# Patient Record
Sex: FEMALE | Race: WHITE | NOT HISPANIC OR LATINO | URBAN - METROPOLITAN AREA
[De-identification: names, ages, dates, MRNs, and addresses within clinical notes are randomized per-mention and may not be internally consistent; named-entity substitution may affect disease eponyms.]

---

## 2020-07-25 ENCOUNTER — TELEPHONE ENCOUNTER (OUTPATIENT)
Dept: URBAN - METROPOLITAN AREA CLINIC 13 | Facility: CLINIC | Age: 45
End: 2020-07-25

## 2020-07-26 ENCOUNTER — TELEPHONE ENCOUNTER (OUTPATIENT)
Dept: URBAN - METROPOLITAN AREA CLINIC 13 | Facility: CLINIC | Age: 45
End: 2020-07-26

## 2020-07-26 RX ORDER — METOPROLOL SUCCINATE 25 MG/1
TK 1 T PO  QD TABLET, EXTENDED RELEASE ORAL
Qty: 90 | Refills: 0 | Status: ACTIVE | COMMUNITY
Start: 2018-07-12

## 2020-07-26 RX ORDER — HYDROCODONE BITARTRATE AND ACETAMINOPHEN 10; 325 MG/1; MG/1
TABLET ORAL
Qty: 10 | Refills: 0 | Status: ACTIVE | COMMUNITY
Start: 2019-03-12

## 2020-07-26 RX ORDER — CLOBETASOL PROPIONATE 0.5 MG/G
APP TO RASH BID OINTMENT TOPICAL
Qty: 45 | Refills: 0 | Status: ACTIVE | COMMUNITY
Start: 2018-08-20

## 2020-07-26 RX ORDER — OMEPRAZOLE 40 MG/1
TK 1 C PO QD AC CAPSULE, DELAYED RELEASE ORAL
Qty: 30 | Refills: 0 | Status: ACTIVE | COMMUNITY
Start: 2019-03-08

## 2020-07-26 RX ORDER — CIPROFLOXACIN HYDROCHLORIDE 500 MG/1
TK 1 T PO Q 12 H FOR 10 DAYS TABLET, FILM COATED ORAL
Qty: 20 | Refills: 0 | Status: ACTIVE | COMMUNITY
Start: 2019-03-05

## 2020-07-26 RX ORDER — TIZANIDINE 4 MG/1
TABLET ORAL
Qty: 60 | Refills: 0 | Status: ACTIVE | COMMUNITY
Start: 2018-07-24

## 2020-07-26 RX ORDER — CLOBETASOL PROPIONATE 0.5 MG/G
OINTMENT TOPICAL
Qty: 45 | Refills: 0 | Status: ACTIVE | COMMUNITY
Start: 2018-08-31

## 2020-07-26 RX ORDER — HYDROCODONE BITARTRATE AND ACETAMINOPHEN 5; 325 MG/1; MG/1
TABLET ORAL
Qty: 25 | Refills: 0 | Status: ACTIVE | COMMUNITY
Start: 2018-06-21

## 2020-07-26 RX ORDER — CLOBETASOL PROPIONATE 0.5 MG/G
AEROSOL, FOAM TOPICAL
Qty: 100 | Refills: 0 | Status: ACTIVE | COMMUNITY
Start: 2018-01-29

## 2020-07-26 RX ORDER — ROSUVASTATIN CALCIUM 10 MG/1
TK 1 T PO  QD TABLET, FILM COATED ORAL
Qty: 90 | Refills: 0 | Status: ACTIVE | COMMUNITY
Start: 2018-08-08

## 2020-07-26 RX ORDER — LORAZEPAM 0.5 MG/1
TAKE 1 TABLET BY MOUTH EVERY 12 HOURS AS NEEDED TABLET ORAL
Qty: 30 | Refills: 0 | Status: ACTIVE | COMMUNITY
Start: 2019-01-24

## 2020-07-26 RX ORDER — SUCRALFATE 1 G/1
TABLET ORAL
Qty: 45 | Refills: 0 | Status: ACTIVE | COMMUNITY
Start: 2019-03-08

## 2020-07-26 RX ORDER — ONDANSETRON 4 MG/1
TABLET, ORALLY DISINTEGRATING ORAL
Qty: 9 | Refills: 0 | Status: ACTIVE | COMMUNITY
Start: 2019-03-12

## 2020-07-26 RX ORDER — TIZANIDINE 4 MG/1
TK 1 T PO  Q 8 H PRN TABLET ORAL
Qty: 60 | Refills: 0 | Status: ACTIVE | COMMUNITY
Start: 2018-05-31

## 2020-07-26 RX ORDER — NEBIVOLOL HYDROCHLORIDE 5 MG/1
TAKE 1 TABLET BY MOUTH EVERY DAY TABLET ORAL
Qty: 90 | Refills: 0 | Status: ACTIVE | COMMUNITY
Start: 2018-08-14

## 2020-07-26 RX ORDER — CLOBETASOL PROPIONATE 0.5 MG/G
APPLY AT BEDTIME TO SCALP AEROSOL, FOAM TOPICAL
Qty: 50 | Refills: 0 | Status: ACTIVE | COMMUNITY
Start: 2018-04-26

## 2020-07-26 RX ORDER — TRAMADOL HYDROCHLORIDE 50 MG/1
TAKE 1 TABLET EVERY 8 HOURS PRN TABLET ORAL
Refills: 0 | Status: ACTIVE | COMMUNITY
Start: 2018-05-31

## 2020-07-26 RX ORDER — CEPHALEXIN 500 MG/1
TK ONE C PO  TID CAPSULE ORAL
Qty: 21 | Refills: 0 | Status: ACTIVE | COMMUNITY
Start: 2018-06-15

## 2020-07-26 RX ORDER — CARISOPRODOL 350 MG/1
TK 1 T PO Q 12 H PRN TABLET ORAL
Qty: 45 | Refills: 0 | Status: ACTIVE | COMMUNITY
Start: 2018-05-16

## 2020-07-26 RX ORDER — AZITHROMYCIN DIHYDRATE 250 MG/1
TABLET, FILM COATED ORAL
Qty: 6 | Refills: 0 | Status: ACTIVE | COMMUNITY
Start: 2018-11-14

## 2020-07-26 RX ORDER — CLOBETASOL PROPIONATE 0.5 MG/ML
SOLUTION TOPICAL
Qty: 50 | Refills: 0 | Status: ACTIVE | COMMUNITY
Start: 2017-05-16

## 2020-07-26 RX ORDER — ESCITALOPRAM 10 MG/1
TAKE 1 TABLET BY MOUTH EVERY DAY TABLET, FILM COATED ORAL
Qty: 30 | Refills: 0 | Status: ACTIVE | COMMUNITY
Start: 2019-01-24

## 2020-07-26 RX ORDER — ONDANSETRON 8 MG/1
TABLET, ORALLY DISINTEGRATING ORAL
Qty: 9 | Refills: 0 | Status: ACTIVE | COMMUNITY
Start: 2019-03-05

## 2021-12-07 ENCOUNTER — OFFICE VISIT (OUTPATIENT)
Dept: URBAN - METROPOLITAN AREA CLINIC 72 | Facility: CLINIC | Age: 46
End: 2021-12-07
Payer: COMMERCIAL

## 2021-12-07 ENCOUNTER — WEB ENCOUNTER (OUTPATIENT)
Dept: URBAN - METROPOLITAN AREA CLINIC 72 | Facility: CLINIC | Age: 46
End: 2021-12-07

## 2021-12-07 ENCOUNTER — LAB OUTSIDE AN ENCOUNTER (OUTPATIENT)
Dept: URBAN - METROPOLITAN AREA CLINIC 72 | Facility: CLINIC | Age: 46
End: 2021-12-07

## 2021-12-07 ENCOUNTER — DASHBOARD ENCOUNTERS (OUTPATIENT)
Age: 46
End: 2021-12-07

## 2021-12-07 VITALS
BODY MASS INDEX: 23.88 KG/M2 | DIASTOLIC BLOOD PRESSURE: 79 MMHG | HEIGHT: 66 IN | TEMPERATURE: 97.3 F | SYSTOLIC BLOOD PRESSURE: 113 MMHG | WEIGHT: 148.6 LBS | HEART RATE: 75 BPM | RESPIRATION RATE: 18 BRPM

## 2021-12-07 DIAGNOSIS — R19.7 DIARRHEA, UNSPECIFIED TYPE: ICD-10-CM

## 2021-12-07 DIAGNOSIS — R19.4 CHANGE IN BOWEL HABITS: ICD-10-CM

## 2021-12-07 DIAGNOSIS — R10.11 RUQ PAIN: ICD-10-CM

## 2021-12-07 PROCEDURE — 99214 OFFICE O/P EST MOD 30 MIN: CPT | Performed by: INTERNAL MEDICINE

## 2021-12-07 RX ORDER — LORAZEPAM 0.5 MG/1
TAKE 1 TABLET BY MOUTH EVERY 12 HOURS AS NEEDED TABLET ORAL
Qty: 30 | Refills: 0 | Status: ON HOLD | COMMUNITY
Start: 2019-01-24

## 2021-12-07 RX ORDER — CIPROFLOXACIN HYDROCHLORIDE 500 MG/1
TK 1 T PO Q 12 H FOR 10 DAYS TABLET, FILM COATED ORAL
Qty: 20 | Refills: 0 | Status: ON HOLD | COMMUNITY
Start: 2019-03-05

## 2021-12-07 RX ORDER — ONDANSETRON 8 MG/1
TABLET, ORALLY DISINTEGRATING ORAL
Qty: 9 | Refills: 0 | Status: ON HOLD | COMMUNITY
Start: 2019-03-05

## 2021-12-07 RX ORDER — CLOBETASOL PROPIONATE 0.5 MG/ML
SOLUTION TOPICAL
Qty: 50 | Refills: 0 | Status: ON HOLD | COMMUNITY
Start: 2017-05-16

## 2021-12-07 RX ORDER — HYDROCODONE BITARTRATE AND ACETAMINOPHEN 10; 325 MG/1; MG/1
TABLET ORAL
Qty: 10 | Refills: 0 | Status: ON HOLD | COMMUNITY
Start: 2019-03-12

## 2021-12-07 RX ORDER — CEPHALEXIN 500 MG/1
TK ONE C PO  TID CAPSULE ORAL
Qty: 21 | Refills: 0 | Status: ON HOLD | COMMUNITY
Start: 2018-06-15

## 2021-12-07 RX ORDER — HYDROCODONE BITARTRATE AND ACETAMINOPHEN 5; 325 MG/1; MG/1
TABLET ORAL
Qty: 25 | Refills: 0 | Status: ON HOLD | COMMUNITY
Start: 2018-06-21

## 2021-12-07 RX ORDER — CLOBETASOL PROPIONATE 0.5 MG/G
AEROSOL, FOAM TOPICAL
Qty: 100 | Refills: 0 | Status: ON HOLD | COMMUNITY
Start: 2018-01-29

## 2021-12-07 RX ORDER — ESCITALOPRAM 10 MG/1
TAKE 1 TABLET BY MOUTH EVERY DAY TABLET, FILM COATED ORAL
Qty: 30 | Refills: 0 | Status: ON HOLD | COMMUNITY
Start: 2019-01-24

## 2021-12-07 RX ORDER — ROSUVASTATIN CALCIUM 10 MG/1
TK 1 T PO  QD TABLET, FILM COATED ORAL
Qty: 90 | Refills: 0 | Status: ACTIVE | COMMUNITY
Start: 2018-08-08

## 2021-12-07 RX ORDER — NEBIVOLOL HYDROCHLORIDE 5 MG/1
TAKE 1 TABLET BY MOUTH EVERY DAY TABLET ORAL
Qty: 90 | Refills: 0 | Status: ACTIVE | COMMUNITY
Start: 2018-08-14

## 2021-12-07 RX ORDER — TIZANIDINE 4 MG/1
TK 1 T PO  Q 8 H PRN TABLET ORAL
Qty: 60 | Refills: 0 | Status: ACTIVE | COMMUNITY
Start: 2018-05-31

## 2021-12-07 RX ORDER — AZITHROMYCIN DIHYDRATE 250 MG/1
TABLET, FILM COATED ORAL
Qty: 6 | Refills: 0 | Status: ON HOLD | COMMUNITY
Start: 2018-11-14

## 2021-12-07 RX ORDER — ONDANSETRON 4 MG/1
TABLET, ORALLY DISINTEGRATING ORAL
Qty: 9 | Refills: 0 | Status: ON HOLD | COMMUNITY
Start: 2019-03-12

## 2021-12-07 RX ORDER — OMEPRAZOLE 40 MG/1
TK 1 C PO QD AC CAPSULE, DELAYED RELEASE ORAL
Qty: 30 | Refills: 0 | Status: ON HOLD | COMMUNITY
Start: 2019-03-08

## 2021-12-07 RX ORDER — SUCRALFATE 1 G/1
TABLET ORAL
Qty: 45 | Refills: 0 | Status: ON HOLD | COMMUNITY
Start: 2019-03-08

## 2021-12-07 RX ORDER — CLOBETASOL PROPIONATE 0.5 MG/G
APPLY AT BEDTIME TO SCALP AEROSOL, FOAM TOPICAL
Qty: 50 | Refills: 0 | Status: ON HOLD | COMMUNITY
Start: 2018-04-26

## 2021-12-07 RX ORDER — CARISOPRODOL 350 MG/1
TK 1 T PO Q 12 H PRN TABLET ORAL
Qty: 45 | Refills: 0 | Status: ON HOLD | COMMUNITY
Start: 2018-05-16

## 2021-12-07 RX ORDER — METOPROLOL SUCCINATE 25 MG/1
TK 1 T PO  QD TABLET, EXTENDED RELEASE ORAL
Qty: 90 | Refills: 0 | Status: ON HOLD | COMMUNITY
Start: 2018-07-12

## 2021-12-07 RX ORDER — TRAMADOL HYDROCHLORIDE 50 MG/1
TAKE 1 TABLET EVERY 8 HOURS PRN TABLET ORAL
Refills: 0 | Status: ACTIVE | COMMUNITY
Start: 2018-05-31

## 2021-12-07 RX ORDER — CLOBETASOL PROPIONATE 0.5 MG/G
APP TO RASH BID OINTMENT TOPICAL
Qty: 45 | Refills: 0 | Status: ON HOLD | COMMUNITY
Start: 2018-08-20

## 2021-12-07 NOTE — HPI-TODAY'S VISIT:
Mrs. Xiao is a 46-year-old female who returns for follow-up, she was last seen in 3/21/2019.  She has a history of psoriasis with psoriatic arthritis, hypertension and kidney stones.  We last saw her she is having diarrhea and abdominal pain.  This was managed conservatively as she had just recovered from acute illness and has been treated with antibiotics.  She was treated for Covid back in September and since that time has had unrelenting right upper quadrant pain and intermittent diarrhea.  She notes that she will have diarrhea for approximately for 5 days going upwards of 20 times a day and her symptoms will subside.  She has actually had a near syncopal event secondary to this.  She denies any blood in her stool.  She endorses generalized malaise.  She has not tried any antidiarrheals. Was treated for "ulcers" by pcp with no improvement.  She reports longstanding issues with GI problems reports that she had a colonoscopy 5 or 6 years ago, does not recall findings.    She denies any weight loss, no blood in her stool, no nausea vomiting.  No new medications.

## 2021-12-17 ENCOUNTER — OFFICE VISIT (OUTPATIENT)
Dept: URBAN - METROPOLITAN AREA MEDICAL CENTER 40 | Facility: MEDICAL CENTER | Age: 46
End: 2021-12-17
Payer: COMMERCIAL

## 2021-12-17 DIAGNOSIS — K31.89 ACQUIRED DEFORMITY OF PYLORUS: ICD-10-CM

## 2021-12-17 DIAGNOSIS — R19.4: ICD-10-CM

## 2021-12-17 PROCEDURE — 43239 EGD BIOPSY SINGLE/MULTIPLE: CPT | Performed by: INTERNAL MEDICINE

## 2021-12-17 PROCEDURE — 45380 COLONOSCOPY AND BIOPSY: CPT | Performed by: INTERNAL MEDICINE
